# Patient Record
Sex: FEMALE | Race: WHITE | NOT HISPANIC OR LATINO | Employment: OTHER | URBAN - METROPOLITAN AREA
[De-identification: names, ages, dates, MRNs, and addresses within clinical notes are randomized per-mention and may not be internally consistent; named-entity substitution may affect disease eponyms.]

---

## 2021-07-01 ENCOUNTER — EVALUATION (OUTPATIENT)
Dept: PHYSICAL THERAPY | Facility: CLINIC | Age: 61
End: 2021-07-01
Payer: COMMERCIAL

## 2021-07-01 DIAGNOSIS — M54.16 LUMBAR RADICULOPATHY: ICD-10-CM

## 2021-07-01 DIAGNOSIS — M17.12 PRIMARY OSTEOARTHRITIS OF LEFT KNEE: Primary | ICD-10-CM

## 2021-07-01 DIAGNOSIS — M79.605 LEFT LEG PAIN: ICD-10-CM

## 2021-07-01 PROCEDURE — 97110 THERAPEUTIC EXERCISES: CPT | Performed by: PHYSICAL THERAPIST

## 2021-07-01 PROCEDURE — 97162 PT EVAL MOD COMPLEX 30 MIN: CPT | Performed by: PHYSICAL THERAPIST

## 2021-07-01 NOTE — PROGRESS NOTES
PT Evaluation     Today's date: 2021  Patient name: Yvan Christensen  : 1960  MRN: [id number]  Referring provider: Meena Hale MD  Dx:   Encounter Diagnosis     ICD-10-CM    1  Primary osteoarthritis of left knee  M17 12    2  Left leg pain  M79 605    3  Lumbar radiculopathy  M54 16               Assessment  Assessment details: Yvan Christensen is a 63 y o  female who presents with pain, decreased strength, decreased ROM, decreased joint mobility and ambulatory dysfunction  Due to these impairments, patient has difficulty performing ADL's, recreational activities, work-related activities, engaging in social activities, ambulation, stair negotiation, lifting/carrying, transfers  Patient's clinical presentation is consistent with their referring diagnosis of patellofemoral arthritis of  L knee, lumbar radiculopathy, and pain of left lower extremity  Patient has been educated in home exercise program and plan of care  Patient would benefit from skilled physical therapy services to address their aforementioned functional limitations and progress towards prior level of function and independence with home exercise program      Impairments: abnormal gait, abnormal or restricted ROM, activity intolerance, impaired balance, impaired physical strength, lacks appropriate home exercise program, pain with function, poor posture  and poor body mechanics  Functional limitations: walk, stand, STS, stair negotiation, squat, kneeling, disturbed sleep, prolonged sit/drive   Prognosis: good  Prognosis details: + factors: high motivation levels, centralization of symptoms with press ups  - factors: chronicity of pain, age    Goals  Short term goals to be accomplished in 4 weeks:  STG 1: Pt will demo I with HEP to maximize progress between therapy sessions  STG 2: Pt will demo L/S AROM < or = min loss throughout to promote improved functional mobility and body mechanics    STG 3: Pt will reports pain dec freq and intensity 50%  STG 4: Pt will deny sleep disturbance  Long term goals to be accomplished in 12 weeks:  LTG 1: Pt will demo good body mech with >75% functional challenges to prevent reinjury  LTG 2: Pt will be able to return to participating in pilate's class for 90 minutes/hours pain free as per PLOF  LTG 3: Pt will note no pain with prolonged sitting or driving  Plan  Plan details: HEP development, stretching, strengthening, A/AA/PROM, joint mobilizations, posture education, STM/MI as needed to reduce muscle tension, muscle reeducation, PLOC discussed and agreed upon with patient  Patient would benefit from: PT eval and skilled physical therapy  Planned modality interventions: cryotherapy, thermotherapy: hydrocollator packs and unattended electrical stimulation  Planned therapy interventions: manual therapy, neuromuscular re-education, self care, therapeutic activities, therapeutic exercise, home exercise program, stretching, strengthening, flexibility, balance and joint mobilization  Frequency: 2x week  Treatment plan discussed with: patient        Subjective Evaluation    History of Present Illness  Mechanism of injury: Pt is a 62 y/o female who presents to PT with reports of L knee and lumbar radicular pain  She stated that the lumbar radicular pain began about 5 years ago  She treated it with PT and pilates, which was very helpful with improving her pain, however she had to stop for personal reasons  She then began to experience L knee and LE pain so she went to her physician who ordered x-rays, which revealed lumbar and L knee patellar arthritis  She was then prescribed PT       Ganglion cyst removed from medial knee 3 years ago        Pain  Current pain ratin  At best pain ratin  At worst pain ratin  Location: L lumbar radiculopathy  Quality: throbbing, tight, pulling and discomfort  Aggravating factors: stair climbing, standing, walking and lifting    Treatments  Current treatment: physical therapy  Patient Goals  Patient goals for therapy: decreased pain, improved balance, increased motion, increased strength, independence with ADLs/IADLs and return to sport/leisure activities  Patient goal: walk, stand, STS, stair negotiation, squat, kneeling, disturbed sleep, prolonged sit/drive        Objective     Concurrent Complaints  Positive for night pain and disturbed sleep  Negative for bladder dysfunction, bowel dysfunction and saddle (S4) numbness    Postural Observations  Standing posture: fair        Tenderness     Additional Tenderness Details  TTP over lumbar/thoracic parapsinals     Active Range of Motion   Cervical/Thoracic Spine       Thoracic    Extension:  Restriction level: moderate  Left lateral flexion:  Restriction level: moderate  Right lateral flexion:  Restriction level: moderate    Lumbar   Flexion:  Restriction level: minimal  Extension:  Restriction level: moderate  Left lateral flexion:  Restriction level: moderate  Right lateral flexion:  Restriction level: moderate  Left rotation:  Restriction level: moderate  Right rotation:  Restriction level: moderate    Joint Play     Hypomobile: T12, L1, L2, L3, L4, L5 and S1     Pain: L2, L3, L4, L5 and S1   Mechanical Assessment    Cervical      Thoracic    Seated extension: repeated movements  Pain location: peripheralized  Pain intensity: worse  Lying extension: repeated movements  Pain location: centralized  Pain intensity: better    Lumbar    Lying extension: repeated movements  Pain location: centralized  Pain intensity: better    Strength/Myotome Testing     Left Hip   Planes of Motion   Flexion: 4-    Right Hip   Planes of Motion   Flexion: 4-    Left Knee   Flexion: 4  Extension: 4    Right Knee   Flexion: 4  Extension: 4    Tests     Lumbar     Left   Positive quadrant  Negative crossed SLR and passive SLR  Right   Positive quadrant  Negative crossed SLR and passive SLR            Precautions:   HTN  Family history of cardiovascular disease  Depression, treated w/ medication and psych    Manuals 7/1            STM             Jt mobs                                       Neuro Re-Ed 7/1                                                                                                       Ther Ex 7/1            REIL 2x10            TONY 2x10            S/l thoracic rotation 2x10            Seated thoracic extension                                                                  Ther Activity 7/1                                                                             Modalities 7/1                         CT

## 2021-07-01 NOTE — LETTER
2021    Lauri Aguilera, [facility]    Patient: Nguyen Lind   YOB: 1960   Date of Visit: 2021     Encounter Diagnosis     ICD-10-CM    1  Primary osteoarthritis of left knee  M17 12    2  Left leg pain  M79 605    3  Lumbar radiculopathy  M54 16        Dear Dr Lizz Roland: Thank you for your recent referral of Nguyen Lind  Please review the attached evaluation summary from Tami's recent visit  Please verify that you agree with the plan of care by signing the attached order  If you have any questions or concerns, please do not hesitate to call  I sincerely appreciate the opportunity to share in the care of one of your patients and hope to have another opportunity to work with you in the near future  Sincerely,    Yajaira Patient, PT      Referring Provider:      I certify that I have read the below Plan of Care and certify the need for these services furnished under this plan of treatment while under my care  Lauri Aguilera, [address]  [address]  Via Fax: [phone]          PT Evaluation     Today's date: 2021  Patient name: Nguyen Lind  : 1960  MRN: [id number]  Referring provider: Manas Lewis MD  Dx:   Encounter Diagnosis     ICD-10-CM    1  Primary osteoarthritis of left knee  M17 12    2  Left leg pain  M79 605    3  Lumbar radiculopathy  M54 16               Assessment  Assessment details: Nguyen Lind is a 63 y o  female who presents with pain, decreased strength, decreased ROM, decreased joint mobility and ambulatory dysfunction  Due to these impairments, patient has difficulty performing ADL's, recreational activities, work-related activities, engaging in social activities, ambulation, stair negotiation, lifting/carrying, transfers   Patient's clinical presentation is consistent with their referring diagnosis of patellofemoral arthritis of  L knee, lumbar radiculopathy, and pain of left lower extremity  Patient has been educated in home exercise program and plan of care  Patient would benefit from skilled physical therapy services to address their aforementioned functional limitations and progress towards prior level of function and independence with home exercise program      Impairments: abnormal gait, abnormal or restricted ROM, activity intolerance, impaired balance, impaired physical strength, lacks appropriate home exercise program, pain with function, poor posture  and poor body mechanics  Functional limitations: walk, stand, STS, stair negotiation, squat, kneeling, disturbed sleep, prolonged sit/drive   Prognosis: good  Prognosis details: + factors: high motivation levels, centralization of symptoms with press ups  - factors: chronicity of pain, age    Goals  Short term goals to be accomplished in 4 weeks:  STG 1: Pt will demo I with HEP to maximize progress between therapy sessions  STG 2: Pt will demo L/S AROM < or = min loss throughout to promote improved functional mobility and body mechanics  STG 3: Pt will reports pain dec freq and intensity 50%  STG 4: Pt will deny sleep disturbance  Long term goals to be accomplished in 12 weeks:  LTG 1: Pt will demo good body mech with >75% functional challenges to prevent reinjury  LTG 2: Pt will be able to return to participating in pilLISNR's class for 90 minutes/hours pain free as per PLOF  LTG 3: Pt will note no pain with prolonged sitting or driving  Plan  Plan details: HEP development, stretching, strengthening, A/AA/PROM, joint mobilizations, posture education, STM/MI as needed to reduce muscle tension, muscle reeducation, PLOC discussed and agreed upon with patient        Patient would benefit from: PT eval and skilled physical therapy  Planned modality interventions: cryotherapy, thermotherapy: hydrocollator packs and unattended electrical stimulation  Planned therapy interventions: manual therapy, neuromuscular re-education, self care, therapeutic activities, therapeutic exercise, home exercise program, stretching, strengthening, flexibility, balance and joint mobilization  Frequency: 2x week  Treatment plan discussed with: patient        Subjective Evaluation    History of Present Illness  Mechanism of injury: Pt is a 64 y/o female who presents to PT with reports of L knee and lumbar radicular pain  She stated that the lumbar radicular pain began about 5 years ago  She treated it with PT and pilates, which was very helpful with improving her pain, however she had to stop for personal reasons  She then began to experience L knee and LE pain so she went to her physician who ordered x-rays, which revealed lumbar and L knee patellar arthritis  She was then prescribed PT  Ganglion cyst removed from medial knee 3 years ago        Pain  Current pain ratin  At best pain ratin  At worst pain ratin  Location: L lumbar radiculopathy  Quality: throbbing, tight, pulling and discomfort  Aggravating factors: stair climbing, standing, walking and lifting    Treatments  Current treatment: physical therapy  Patient Goals  Patient goals for therapy: decreased pain, improved balance, increased motion, increased strength, independence with ADLs/IADLs and return to sport/leisure activities  Patient goal: walk, stand, STS, stair negotiation, squat, kneeling, disturbed sleep, prolonged sit/drive        Objective     Concurrent Complaints  Positive for night pain and disturbed sleep   Negative for bladder dysfunction, bowel dysfunction and saddle (S4) numbness    Postural Observations  Standing posture: fair        Tenderness     Additional Tenderness Details  TTP over lumbar/thoracic parapsinals     Active Range of Motion   Cervical/Thoracic Spine       Thoracic    Extension:  Restriction level: moderate  Left lateral flexion:  Restriction level: moderate  Right lateral flexion: Restriction level: moderate    Lumbar   Flexion:  Restriction level: minimal  Extension:  Restriction level: moderate  Left lateral flexion:  Restriction level: moderate  Right lateral flexion:  Restriction level: moderate  Left rotation:  Restriction level: moderate  Right rotation:  Restriction level: moderate    Joint Play     Hypomobile: T12, L1, L2, L3, L4, L5 and S1     Pain: L2, L3, L4, L5 and S1   Mechanical Assessment    Cervical      Thoracic    Seated extension: repeated movements  Pain location: peripheralized  Pain intensity: worse  Lying extension: repeated movements  Pain location: centralized  Pain intensity: better    Lumbar    Lying extension: repeated movements  Pain location: centralized  Pain intensity: better    Strength/Myotome Testing     Left Hip   Planes of Motion   Flexion: 4-    Right Hip   Planes of Motion   Flexion: 4-    Left Knee   Flexion: 4  Extension: 4    Right Knee   Flexion: 4  Extension: 4    Tests     Lumbar     Left   Positive quadrant  Negative crossed SLR and passive SLR  Right   Positive quadrant  Negative crossed SLR and passive SLR            Precautions:   HTN  Family history of cardiovascular disease  Depression, treated w/ medication and psych    Manuals 7/1            STM             Jt mobs                                       Neuro Re-Ed 7/1                                                                                                       Ther Ex 7/1            REIL 2x10            TONY 2x10            S/l thoracic rotation 2x10            Seated thoracic extension                                                                  Ther Activity 7/1                                                                             Modalities 7/1                         CT

## 2021-07-14 ENCOUNTER — APPOINTMENT (OUTPATIENT)
Dept: PHYSICAL THERAPY | Facility: CLINIC | Age: 61
End: 2021-07-14
Payer: COMMERCIAL

## 2021-07-19 ENCOUNTER — OFFICE VISIT (OUTPATIENT)
Dept: PHYSICAL THERAPY | Facility: CLINIC | Age: 61
End: 2021-07-19
Payer: COMMERCIAL

## 2021-07-19 DIAGNOSIS — M17.12 PRIMARY OSTEOARTHRITIS OF LEFT KNEE: Primary | ICD-10-CM

## 2021-07-19 DIAGNOSIS — M79.605 LEFT LEG PAIN: ICD-10-CM

## 2021-07-19 DIAGNOSIS — M54.16 LUMBAR RADICULOPATHY: ICD-10-CM

## 2021-07-19 PROCEDURE — 97112 NEUROMUSCULAR REEDUCATION: CPT

## 2021-07-19 PROCEDURE — 97110 THERAPEUTIC EXERCISES: CPT

## 2021-07-19 NOTE — PROGRESS NOTES
Daily Note     Today's date: 2021  Patient name: Madeline Kraft  : 1960  MRN: [id number]  Referring provider: Sandro Gonzalez MD  Dx:   Encounter Diagnosis     ICD-10-CM    1  Primary osteoarthritis of left knee  M17 12    2  Left leg pain  M79 605    3  Lumbar radiculopathy  M54 16                   Subjective: Elis Candelario reports over the last few weeks she has not been as complaint with HEP stretching as she should be, however when she does do the exercises her low back feels better  Objective: See treatment diary below      Assessment: Elis Candelario tolerated PT treatment well  Reviewed lumbar thoracic mobility exercise with patient, pt performed well  Initiated core stability and strength training as noted by PT  She displays strong TA engagement and was able to maintain neurtral spine throughout LE movements  Greatest challenge observed with pallof press secondary to weakness  Patient denied symptom provocation throughout and post treatment session  Pt would benefit from continued PT to further address impairments and maximize functional level  Plan: Continue per plan of care        Precautions:   HTN  Family history of cardiovascular disease  Depression, treated w/ medication and psych    Manuals            STM             Jt mobs                                       Neuro Re-Ed            TA march   20x           TA SLR   2x10 b/l            TA BKFO   20x           Pallof press  10/ 2x10           Pallof rotation                                        Ther Ex            REIL 2x10 2x10            TONY 2x10 2x10           S/l thoracic rotation 2x10 2x10            Seated thoracic extension   Handstogether 2x10            Side bends   20x b/l                                                  Ther Activity                                                                             Modalities            [address]             CT

## 2021-07-22 ENCOUNTER — OFFICE VISIT (OUTPATIENT)
Dept: PHYSICAL THERAPY | Facility: CLINIC | Age: 61
End: 2021-07-22
Payer: COMMERCIAL

## 2021-07-22 DIAGNOSIS — M54.16 LUMBAR RADICULOPATHY: ICD-10-CM

## 2021-07-22 DIAGNOSIS — M17.12 PRIMARY OSTEOARTHRITIS OF LEFT KNEE: Primary | ICD-10-CM

## 2021-07-22 DIAGNOSIS — M79.605 LEFT LEG PAIN: ICD-10-CM

## 2021-07-22 PROCEDURE — 97140 MANUAL THERAPY 1/> REGIONS: CPT | Performed by: PHYSICAL THERAPIST

## 2021-07-22 PROCEDURE — 97112 NEUROMUSCULAR REEDUCATION: CPT | Performed by: PHYSICAL THERAPIST

## 2021-07-22 PROCEDURE — 97110 THERAPEUTIC EXERCISES: CPT | Performed by: PHYSICAL THERAPIST

## 2021-07-22 NOTE — PROGRESS NOTES
Daily Note     Today's date: 2021  Patient name: Aidan Munson  : 1960  MRN: [id number]  Referring provider: Azar Ledbetter MD  Dx:   Encounter Diagnosis     ICD-10-CM    1  Primary osteoarthritis of left knee  M17 12    2  Left leg pain  M79 605    3  Lumbar radiculopathy  M54 16             Subjective: Pt reported that she did buckle her knee recently, and stated that it did increase her pain, however the pain has been reduced to 1/10 since  Objective: See treatment diary below    Assessment: Tolerated treatment well  Advanced core stabilization well without provocation of pain  Patient demonstrated fatigue post treatment, exhibited good technique with therapeutic exercises and would benefit from continued PT  Plan: Continue per plan of care        Precautions:   HTN  Family history of cardiovascular disease  Depression, treated w/ medication and psych    Manuals           STM             Jt mobs             Mobilization with movement/over pressure   JZ                       Neuro Re-Ed           TA march   20x           TA SLR   2x10 b/l            TA BKFO   20x           Pallof press  10/ 2x10 10/ 3x10          Pallof rotation    9/ 3x10          pallof punch   13/ 3x10          Plank   :25x3          BOSU crunch   3x10                                    Ther Ex           REIL 2x10 2x10  10x          TONY 2x10 2x10           S/l thoracic rotation 2x10 2x10  20x w/ over press          Seated thoracic extension   Hands together 2x10  Hand together w/ over press 20x          Side bends   20x b/l           Cat/cow   2x10                                    Ther Activity                                                                            Modalities                        CT

## 2021-07-26 ENCOUNTER — OFFICE VISIT (OUTPATIENT)
Dept: PHYSICAL THERAPY | Facility: CLINIC | Age: 61
End: 2021-07-26
Payer: COMMERCIAL

## 2021-07-26 DIAGNOSIS — M79.605 LEFT LEG PAIN: ICD-10-CM

## 2021-07-26 DIAGNOSIS — M54.16 LUMBAR RADICULOPATHY: ICD-10-CM

## 2021-07-26 DIAGNOSIS — M17.12 PRIMARY OSTEOARTHRITIS OF LEFT KNEE: Primary | ICD-10-CM

## 2021-07-26 PROCEDURE — 97110 THERAPEUTIC EXERCISES: CPT

## 2021-07-26 PROCEDURE — 97112 NEUROMUSCULAR REEDUCATION: CPT

## 2021-07-26 NOTE — PROGRESS NOTES
Daily Note     Today's date: 2021  Patient name: Meena Sheikh  : 1960  MRN: [id number]  Referring provider: Tania Etienne MD  Dx:   Encounter Diagnosis     ICD-10-CM    1  Primary osteoarthritis of left knee  M17 12    2  Left leg pain  M79 605    3  Lumbar radiculopathy  M54 16             Subjective: Christiano Andre reports over the weekend she has some low back discomfort after prolonged standing  She states she went on a 4 mile walk this morning, L knee felt a little aggrivated     Objective: See treatment diary below    Assessment: Christiano Andre tolerated PT treatment well  Initiated PT session with lumbar and thoracic mobility exercises  Continued with focus on core strength and stabilization training this session  Advanced TA march to  toe taps, pt challenged with LE control  She performed prescribed exercises well and w/o symptom provocation  Fatigue evident post session  Pt would benefit from continued PT to further address impairments and maximize functional level  Plan: Continue per plan of care        Precautions:   HTN  Family history of cardiovascular disease  Depression, treated w/ medication and psych    Manuals          STM             Jt mobs             Mobilization with movement/over pressure   JZ                       Neuro Re-Ed          TA march   20x  90/90 toe taps 20x          TA SLR   2x10 b/l   3x10 b/l          TA BKFO   20x           Pallof press  10/ 2x10 10/ 3x10 10/ 3x10          Pallof rotation    9/ 3x10 9/ 3x10         pallof punch   13/ 3x10          Plank   :25x3 :30x3          BOSU crunch   3x10 3x10                                    Ther Ex          REIL 2x10 2x10  10x 2x10         TONY 2x10 2x10           S/l thoracic rotation 2x10 2x10  20x w/ over press 3# 20x b/l          Seated thoracic extension   Hands together 2x10  Hand together w/ over press 20x Hands together 30x          Side bends   20x b/l Cat/cow   2x10 2x10                                    Ther Activity 7/1 7/19 7/22 7/26                                                                          Modalities 7/1 7/19 7/22 7/26                      CT

## 2021-07-29 ENCOUNTER — OFFICE VISIT (OUTPATIENT)
Dept: PHYSICAL THERAPY | Facility: CLINIC | Age: 61
End: 2021-07-29
Payer: COMMERCIAL

## 2021-07-29 DIAGNOSIS — M54.16 LUMBAR RADICULOPATHY: ICD-10-CM

## 2021-07-29 DIAGNOSIS — M17.12 PRIMARY OSTEOARTHRITIS OF LEFT KNEE: Primary | ICD-10-CM

## 2021-07-29 DIAGNOSIS — M79.605 LEFT LEG PAIN: ICD-10-CM

## 2021-07-29 PROCEDURE — 97110 THERAPEUTIC EXERCISES: CPT

## 2021-07-29 PROCEDURE — 97530 THERAPEUTIC ACTIVITIES: CPT

## 2021-07-29 PROCEDURE — 97140 MANUAL THERAPY 1/> REGIONS: CPT

## 2021-07-29 NOTE — PROGRESS NOTES
Daily Note     Today's date: 2021  Patient name: Jill Barrett  : 1960  MRN: [id number]  Referring provider: Kassidy Reyes MD  Dx:   Encounter Diagnosis     ICD-10-CM    1  Primary osteoarthritis of left knee  M17 12    2  Left leg pain  M79 605    3  Lumbar radiculopathy  M54 16             Subjective: Baldev Gupta reports her back has been feeing "pretty good" the last week  She states the other day she "tweaked" her L knee and has experienced soreness since  She states she is still able to complete a lot activity, but feels her knee is there  Objective: See treatment diary below    Assessment: Baldev Gupta tolerated PT treatment well  Today's session focused on LE stretching and strength training  L knee PROM wfl, some TTP present along proximal peroneals with STM  Progress LE strengthening with addition of step ups, lateral steps downs, lunges, and TB bridges  Pt was challenged with eccnetric control of lateral step down, greater difficulty observed on LLE  She denied L knee pain throughout and post session  Pt would benefit from continued PT to further address impairments and maximize functional level  Plan: Continue per plan of care        Precautions:   HTN  Family history of cardiovascular disease  Depression, treated w/ medication and psych    Manuals         STM             Jt mobs             Mobilization with movement/over pressure   JZ                       Neuro Re-Ed         TA march   20x  90/90 toe taps 20x          TA SLR   2x10 b/l   3x10 b/l  3x10b/l        TA BKFO   20x           Pallof press  10/ 2x10 10/ 3x10 10/ 3x10          Pallof rotation    9/ 3x10 9/ 3x10         pallof punch   13/ 3x10          Plank   :25x3 :30x3          BOSU crunch   3x10 3x10                                    Ther Ex         REIL 2x10 2x10  10x 2x10         TONY 2x10 2x10           S/l thoracic rotation 2x10 2x10  20x w/ over press 3# 20x b/l          Seated thoracic extension   Hands together 2x10  Hand together w/ over press 20x Hands together 30x          Side bends   20x b/l           Cat/cow   2x10 2x10          Bridges      BTB 3x10         Lunges      x20 b/l         Ther Activity 7/1 7/19 7/22 7/26 7/29        Step ups      8" 3x10        Lateral step downs      8" 3x10                                                Modalities 7/1 7/19 7/22 7/26 7/29        [address]             CT

## 2021-08-02 ENCOUNTER — OFFICE VISIT (OUTPATIENT)
Dept: PHYSICAL THERAPY | Facility: CLINIC | Age: 61
End: 2021-08-02
Payer: COMMERCIAL

## 2021-08-02 DIAGNOSIS — M17.12 PRIMARY OSTEOARTHRITIS OF LEFT KNEE: Primary | ICD-10-CM

## 2021-08-02 DIAGNOSIS — M79.605 LEFT LEG PAIN: ICD-10-CM

## 2021-08-02 DIAGNOSIS — M54.16 LUMBAR RADICULOPATHY: ICD-10-CM

## 2021-08-02 PROCEDURE — 97110 THERAPEUTIC EXERCISES: CPT

## 2021-08-02 PROCEDURE — 97530 THERAPEUTIC ACTIVITIES: CPT

## 2021-08-02 NOTE — PROGRESS NOTES
Hands together 30x          Side bends   20x b/l           Cat/cow   2x10 2x10   2x10       Clamshells       BTB 20x       Bridges      BTB 3x10  BTB 3x10        Lunges      x20 b/l  x20 b/l        Ther Activity 7/1 7/19 7/22 7/26 7/29 8/2       Step ups      8" 3x10 8" 3x10       Lateral step downs      8" 3x10  8" 3x10                                              Modalities 7/1 7/19 7/22 7/26 7/29 8/2                    CT

## 2021-08-05 ENCOUNTER — APPOINTMENT (OUTPATIENT)
Dept: PHYSICAL THERAPY | Facility: CLINIC | Age: 61
End: 2021-08-05
Payer: COMMERCIAL

## 2021-08-06 ENCOUNTER — OFFICE VISIT (OUTPATIENT)
Dept: PHYSICAL THERAPY | Facility: CLINIC | Age: 61
End: 2021-08-06
Payer: COMMERCIAL

## 2021-08-06 DIAGNOSIS — M79.605 LEFT LEG PAIN: ICD-10-CM

## 2021-08-06 DIAGNOSIS — M54.16 LUMBAR RADICULOPATHY: ICD-10-CM

## 2021-08-06 DIAGNOSIS — M17.12 PRIMARY OSTEOARTHRITIS OF LEFT KNEE: Primary | ICD-10-CM

## 2021-08-06 PROCEDURE — 97530 THERAPEUTIC ACTIVITIES: CPT | Performed by: PHYSICAL THERAPIST

## 2021-08-06 PROCEDURE — 97110 THERAPEUTIC EXERCISES: CPT | Performed by: PHYSICAL THERAPIST

## 2021-08-06 NOTE — PROGRESS NOTES
Daily Note     Today's date: 2021  Patient name: Megan Horn  : 1960  MRN: [id number]  Referring provider: Toñito Diane MD  Dx:   Encounter Diagnosis     ICD-10-CM    1  Primary osteoarthritis of left knee  M17 12    2  Left leg pain  M79 605    3  Lumbar radiculopathy  M54 16             Subjective: Pt reported that she is improving significantly with her back pain, however notes that she is frustrated by her L knee pain and feeling of instability  Objective: See treatment diary below    Assessment: Tolerated treatment well  Advanced strengthening well without provocation of pain  Patient demonstrated fatigue post treatment, exhibited good technique with therapeutic exercises and would benefit from continued PT  Plan: Continue per plan of care        Precautions:   HTN  Family history of cardiovascular disease  Depression, treated w/ medication and psych    Manuals       STM             Jt mobs             Mobilization with movement/over pressure   JZ                       Neuro Re-Ed       TA march   20x  90/90 toe taps 20x          TA SLR   2x10 b/l   3x10 b/l  3x10b/l 3x10b/l       TA BKFO   20x           Pallof press  10/ 2x10 10/ 3x10 10/ 3x10          Pallof rotation    9/ 3x10 9/ 3x10         pallof punch   13/ 3x10          Plank   :25x3 :30x3          BOSU crunch   3x10 3x10                                    Ther Ex       REIL 2x10 2x10  10x 2x10         TONY 2x10 2x10           S/l thoracic rotation 2x10 2x10  20x w/ over press 3# 20x b/l   3# 20x b/l        Seated thoracic extension   Hands together 2x10  Hand together w/ over press 20x Hands together 30x          Side bends   20x b/l           Prone knee flexion stretch       :30x3      Seated hamstring stretch step       :30x3      S/l hip abduction       2x10 ea      Figure 4 bridge       2x10 ea      S/l hip abd       2x10 ea Cat/cow   2x10 2x10   2x10       Clamshells       BTB 20x       Bridges      BTB 3x10  BTB 3x10        Lunges      x20 b/l  x20 b/l        Ther Activity 7/1 7/19 7/22 7/26 7/29 8/2 8/6      Step ups      8" 3x10 8" 3x10       Lateral step downs      8" 3x10  8" 3x10       Elliptical        3'       Leg press u/l in plantar flexion       30/ 3x15                   Modalities 7/1 7/19 7/22 7/26 7/29 8/2 8/6                   CT

## 2021-08-09 ENCOUNTER — OFFICE VISIT (OUTPATIENT)
Dept: PHYSICAL THERAPY | Facility: CLINIC | Age: 61
End: 2021-08-09
Payer: COMMERCIAL

## 2021-08-09 DIAGNOSIS — M54.16 LUMBAR RADICULOPATHY: ICD-10-CM

## 2021-08-09 DIAGNOSIS — M17.12 PRIMARY OSTEOARTHRITIS OF LEFT KNEE: Primary | ICD-10-CM

## 2021-08-09 DIAGNOSIS — M79.605 LEFT LEG PAIN: ICD-10-CM

## 2021-08-09 PROCEDURE — 97530 THERAPEUTIC ACTIVITIES: CPT

## 2021-08-09 PROCEDURE — 97110 THERAPEUTIC EXERCISES: CPT

## 2021-08-09 NOTE — PROGRESS NOTES
Daily Note     Today's date: 2021  Patient name: Valentine Diaz  : 1960  MRN: [id number]  Referring provider: Tyron Solis MD  Dx:   Encounter Diagnosis     ICD-10-CM    1  Primary osteoarthritis of left knee  M17 12    2  Left leg pain  M79 605    3  Lumbar radiculopathy  M54 16             Subjective: Milli Jones reports she felt good after last PT session  However over the weekend, she was standing for multiple hours cooking, causing increased low back pain by the end of the night  She went on a 5 mile walk the following day, reporting L knee pain by the end of the walk  Objective: See treatment diary below    Assessment: Milli Jones tolerated PT treatment well  Initiated PT session with warmup on RB and lumbar/thoracic mobility exercises  Continued with glut strengthening, weakness remains evident  She performed therapeutic exercises with good technique, minimal cueing required  She denied L knee or lumbar sx aggravation throughout and post session  Pt would benefit from continued PT to further address impairments and maximize functional level  Plan: Continue per plan of care        Precautions:   HTN  Family history of cardiovascular disease  Depression, treated w/ medication and psych    Manuals      STM             Jt mobs             Mobilization with movement/over pressure   JZ                       Neuro Re-Ed      TA march   20x  90/90 toe taps 20x          TA SLR   2x10 b/l   3x10 b/l  3x10b/l 3x10b/l  3x10 b/l     TA BKFO   20x           Pallof press  10/ 2x10 10/ 3x10 10/ 3x10          Pallof rotation    9/ 3x10 9/ 3x10         pallof punch   13/ 3x10          Plank   :25x3 :30x3          BOSU crunch   3x10 3x10                                    Ther Ex      REIL 2x10 2x10  10x 2x10         TONY 2x10 2x10           S/l thoracic rotation 2x10 2x10  20x w/ over press 3# 20x b/l   3# 20x b/l   3# 20x b/l      Seated thoracic extension   Hands together 2x10  Hand together w/ over press 20x Hands together 30x          Side bends   20x b/l           Prone knee flexion stretch       :30x3 :30x3     Seated hamstring stretch step       :30x3 :30x3     S/l hip abduction       2x10 ea 2x10      Figure 4 bridge       2x10 ea 3x10 ea      Cat/cow   2x10 2x10   2x10       Clamshells       BTB 20x       Bridges      BTB 3x10  BTB 3x10        Lunges      x20 b/l  x20 b/l        Ther Activity 7/1 7/19 7/22 7/26 7/29 8/2 8/6 8/9     Step ups      8" 3x10 8" 3x10       Lateral step downs      8" 3x10  8" 3x10       Elliptical        3'  RB 5'     Leg press u/l in plantar flexion       30/ 3x15 30/ 3x15                  Modalities 7/1 7/19 7/22 7/26 7/29 8/2 8/6 8/9                  CT

## 2021-08-12 ENCOUNTER — OFFICE VISIT (OUTPATIENT)
Dept: PHYSICAL THERAPY | Facility: CLINIC | Age: 61
End: 2021-08-12
Payer: COMMERCIAL

## 2021-08-12 DIAGNOSIS — M54.16 LUMBAR RADICULOPATHY: ICD-10-CM

## 2021-08-12 DIAGNOSIS — M79.605 LEFT LEG PAIN: ICD-10-CM

## 2021-08-12 DIAGNOSIS — M17.12 PRIMARY OSTEOARTHRITIS OF LEFT KNEE: Primary | ICD-10-CM

## 2021-08-12 PROCEDURE — 97110 THERAPEUTIC EXERCISES: CPT | Performed by: PHYSICAL THERAPIST

## 2021-08-12 PROCEDURE — 97140 MANUAL THERAPY 1/> REGIONS: CPT | Performed by: PHYSICAL THERAPIST

## 2021-08-12 PROCEDURE — 97530 THERAPEUTIC ACTIVITIES: CPT | Performed by: PHYSICAL THERAPIST

## 2021-08-12 NOTE — PROGRESS NOTES
Daily Note     Today's date: 2021  Patient name: Catarina Ramirez  : 1960  MRN: [id number]  Referring provider: Sariah Cantu MD  Dx:   Encounter Diagnosis     ICD-10-CM    1  Primary osteoarthritis of left knee  M17 12    2  Left leg pain  M79 605    3  Lumbar radiculopathy  M54 16             Subjective: Pt reported, "I am actually a little bit better today "     Objective: See treatment diary below    Assessment: Tolerated treatment well  Patient demonstrated fatigue post treatment, exhibited good technique with therapeutic exercises and would benefit from continued PT  Plan: Continue per plan of care        Precautions:   HTN  Family history of cardiovascular disease   Depression, treated w/ medication and psych    Manuals     STM         JZ    Jt mobs             Mobilization with movement/over pressure   MAIDA BEY                 Neuro Re-Ed     TA march   20x  90/90 toe taps 20x          TA SLR   2x10 b/l   3x10 b/l  3x10b/l 3x10b/l  3x10 b/l     TA BKFO   20x           Pallof press  10/ 2x10 10/ 3x10 10/ 3x10          Pallof rotation    9/ 3x10 9/ 3x10         pallof punch   13/ 3x10          Plank   :25x3 :30x3          BOSU crunch   3x10 3x10                                    Ther Ex     REIL 2x10 2x10  10x 2x10         TONY 2x10 2x10           S/l thoracic rotation 2x10 2x10  20x w/ over press 3# 20x b/l   3# 20x b/l   3# 20x b/l      Seated thoracic extension   Hands together 2x10  Hand together w/ over press 20x Hands together 30x          Calf stretch step         :30x3    Prone knee flexion stretch       :30x3 :30x3 :30x3 ea    Seated hamstring stretch step       :30x3 :30x3 :30x3 ea    S/l hip abduction       2x10 ea 2x10      Figure 4 bridge       2x10 ea 3x10 ea      Cat/cow   2x10 2x10   2x10       Clamshells       BTB 20x       Bridges      BTB 3x10  BTB 3x10        Lunges      x20 b/l  x20 b/l    Slider 2x10 ea    Ther Activity 7/1 7/19 7/22 7/26 7/29 8/2 8/6 8/9 8/12    Step ups      8" 3x10 8" 3x10   8" 3x10    Lateral step downs      8" 3x10  8" 3x10   8" 3x10    Elliptical        3'  RB 5' RB 5'     Leg press u/l in plantar flexion       30/ 3x15 30/ 3x15 30/     LP 2 leg concentric, 1 leg eccentric         70/ 3x10    Modalities 7/1 7/19 7/22 7/26 7/29 8/2 8/6 8/9 8/12                 CT

## 2021-08-16 ENCOUNTER — APPOINTMENT (OUTPATIENT)
Dept: PHYSICAL THERAPY | Facility: CLINIC | Age: 61
End: 2021-08-16
Payer: COMMERCIAL

## 2021-08-20 ENCOUNTER — OFFICE VISIT (OUTPATIENT)
Dept: PHYSICAL THERAPY | Facility: CLINIC | Age: 61
End: 2021-08-20
Payer: COMMERCIAL

## 2021-08-20 DIAGNOSIS — M54.16 LUMBAR RADICULOPATHY: ICD-10-CM

## 2021-08-20 DIAGNOSIS — M17.12 PRIMARY OSTEOARTHRITIS OF LEFT KNEE: Primary | ICD-10-CM

## 2021-08-20 DIAGNOSIS — M79.605 LEFT LEG PAIN: ICD-10-CM

## 2021-08-20 PROCEDURE — 97530 THERAPEUTIC ACTIVITIES: CPT

## 2021-08-20 PROCEDURE — 97110 THERAPEUTIC EXERCISES: CPT

## 2021-08-20 PROCEDURE — 97140 MANUAL THERAPY 1/> REGIONS: CPT

## 2021-08-20 NOTE — PROGRESS NOTES
Daily Note     Today's date: 2021  Patient name: Yvan Christensen  : 1960  MRN: [id number]  Referring provider: Meena Hale MD  Dx:   Encounter Diagnosis     ICD-10-CM    1  Primary osteoarthritis of left knee  M17 12    2  Left leg pain  M79 605    3  Lumbar radiculopathy  M54 16             Subjective: Jose Doran reports she did not have a very active week, therefor her knee and low back have had minimal discomfort  Objective: See treatment diary below    Assessment: Jose Doran displays good tolerance to current POC  Advanced LE strength training with increased repetitions, pt performed well  She completed stair navigation w/o symptom provocation  Pt responds favorably to over pressure with thoracic and extension mobilization  Fatigue evident post session  Pt would benefit from continued PT to further address impairments and maximize functional level  Plan: Continue per plan of care      ladfeoi  Precautions:   HTN  Family history of cardiovascular disease   Depression, treated w/ medication and psych    Manuals    STM         MAIDA    Jt mobs             Mobilization with movement/over pressure   MAIDA BEY JW                Neuro Re-Ed    TA march   20x  90/90 toe taps 20x          TA SLR   2x10 b/l   3x10 b/l  3x10b/l 3x10b/l  3x10 b/l     TA BKFO   20x           Pallof press  10/ 2x10 10/ 3x10 10/ 3x10          Pallof rotation    9/ 3x10 9/ 3x10         pallof punch   13/ 3x10          Plank   :25x3 :30x3          BOSU crunch   3x10 3x10                                    Ther Ex    REIL 2x10 2x10  10x 2x10         TONY 2x10 2x10           S/l thoracic rotation 2x10 2x10  20x w/ over press 3# 20x b/l   3# 20x b/l   3# 20x b/l   3# 20x b/l    Seated thoracic extension   Hands together 2x10  Hand together w/ over press 20x Hands together 30x          Calf stretch step         :30x3 :30x3    Prone knee flexion stretch       :30x3 :30x3 :30x3 ea :30x3 ea    Seated hamstring stretch step       :30x3 :30x3 :30x3 ea :30x3 ea    S/l hip abduction       2x10 ea 2x10      Figure 4 bridge       2x10 ea 3x10 ea      Cat/cow   2x10 2x10   2x10       Clamshells       BTB 20x       Bridges      BTB 3x10  BTB 3x10        Lunges      x20 b/l  x20 b/l    Slider 2x10 ea Slider 3x10 ea fw lat 2x10 ea   Ther Activity 7/1 7/19 7/22 7/26 7/29 8/2 8/6 8/9 8/12    Step ups      8" 3x10 8" 3x10   8" 3x10 8" 3x10   Lateral step downs      8" 3x10  8" 3x10   8" 3x10 8" 3x10   Elliptical        3'  RB 5' RB 5'  RB 5'    Leg press u/l in plantar flexion       30/ 3x15 30/ 3x15 30/     LP 2 leg concentric, 1 leg eccentric         70/ 3x10 70/ 3x10    Modalities 7/1 7/19 7/22 7/26 7/29 8/2 8/6 8/9 8/12 8/20                CT

## 2021-08-24 ENCOUNTER — OFFICE VISIT (OUTPATIENT)
Dept: PHYSICAL THERAPY | Facility: CLINIC | Age: 61
End: 2021-08-24
Payer: COMMERCIAL

## 2021-08-24 DIAGNOSIS — M54.16 LUMBAR RADICULOPATHY: ICD-10-CM

## 2021-08-24 DIAGNOSIS — M17.12 PRIMARY OSTEOARTHRITIS OF LEFT KNEE: Primary | ICD-10-CM

## 2021-08-24 DIAGNOSIS — M79.605 LEFT LEG PAIN: ICD-10-CM

## 2021-08-24 PROCEDURE — 97110 THERAPEUTIC EXERCISES: CPT

## 2021-08-24 PROCEDURE — 97530 THERAPEUTIC ACTIVITIES: CPT

## 2021-08-24 NOTE — PROGRESS NOTES
Daily Note     Today's date: 2021  Patient name: Anjel Clayton  : 1960  MRN: [id number]  Referring provider: Velvet Barros MD  Dx:   Encounter Diagnosis     ICD-10-CM    1  Primary osteoarthritis of left knee  M17 12    2  Left leg pain  M79 605    3  Lumbar radiculopathy  M54 16             Subjective: Naveen Jack reports she went on a stream hike over the weekend, noting her knee felt unsttable   She states L knee continues to be bothersome with vigorous activity  Objective: See treatment diary below    Assessment: Naveen Jack tolerated PT treatment well  Today's session focused on knee ROM and LE strength training  Pt performed prescribed exercises well and w/o symptom provocation  Weighted SLR added back into exercise program, greater challenged on LLE  Pt would benefit from continued PT to further address impairments and maximize functional level  Plan: Continue per plan of care      ladfeoi  Precautions:   HTN  Family history of cardiovascular disease   Depression, treated w/ medication and psych    Manuals    STM         JZ    Jt mobs             Mobilization with movement/over pressure   MAIDA CHAVEZZ JW                Neuro Re-Ed    TA  toe taps 20x          TA SLR  4#  3x10    3x10 b/l  3x10b/l 3x10b/l  3x10 b/l     TA BKFO              Pallof press   10/ 3x10 10/ 3x10          Pallof rotation    9/ 3x10 9/ 3x10         pallof punch   13/ 3x10          Plank   :25x3 :30x3          BOSU crunch   3x10 3x10                                    Ther Ex    REIL   10x 2x10         TONY             S/l thoracic rotation   20x w/ over press 3# 20x b/l   3# 20x b/l   3# 20x b/l   3# 20x b/l    Seated thoracic extension    Hand together w/ over press 20x Hands together 30x          Calf stretch step         :30x3 :30x3    Prone knee flexion stretch :30x3 :30x3 :30x3 :30x3 ea :30x3 ea    Seated hamstring stretch step :30x3 ea       :30x3 :30x3 :30x3 ea :30x3 ea    S/l hip abduction       2x10 ea 2x10      Figure 4 bridge       2x10 ea 3x10 ea      Cat/cow   2x10 2x10   2x10       Clamshells       BTB 20x       Bridges      BTB 3x10  BTB 3x10        Lunges  Fwd/lat 3x10    x20 b/l  x20 b/l    Slider 2x10 ea Slider 3x10 ea fw lat 2x10 ea   Ther Activity 8/24 7/22 7/26 7/29 8/2 8/6 8/9 8/12    Step ups  8" 3x10    8" 3x10 8" 3x10   8" 3x10 8" 3x10   Lateral step downs  8" 3x10    8" 3x10  8" 3x10   8" 3x10 8" 3x10   Elliptical  RB 5'       3'  RB 5' RB 5'  RB 5'    Leg press u/l in plantar flexion 30/ 3x10       30/ 3x15 30/ 3x15 30/     LP 2 leg concentric, 1 leg eccentric 70/ 3x10         70/ 3x10 70/ 3x10    Modalities   7/22 7/26 7/29 8/2 8/6 8/9 8/12 8/20                CT

## 2021-08-26 ENCOUNTER — OFFICE VISIT (OUTPATIENT)
Dept: PHYSICAL THERAPY | Facility: CLINIC | Age: 61
End: 2021-08-26
Payer: COMMERCIAL

## 2021-08-26 DIAGNOSIS — M79.605 LEFT LEG PAIN: ICD-10-CM

## 2021-08-26 DIAGNOSIS — M54.16 LUMBAR RADICULOPATHY: ICD-10-CM

## 2021-08-26 DIAGNOSIS — M17.12 PRIMARY OSTEOARTHRITIS OF LEFT KNEE: Primary | ICD-10-CM

## 2021-08-26 PROCEDURE — 97140 MANUAL THERAPY 1/> REGIONS: CPT | Performed by: PHYSICAL THERAPIST

## 2021-08-26 PROCEDURE — 97110 THERAPEUTIC EXERCISES: CPT | Performed by: PHYSICAL THERAPIST

## 2021-08-26 NOTE — PROGRESS NOTES
Daily Note     Today's date: 2021  Patient name: Markus Saavedra  : 1960  MRN: [id number]  Referring provider: Igor Wang MD  Dx:   Encounter Diagnosis     ICD-10-CM    1  Primary osteoarthritis of left knee  M17 12    2  Left leg pain  M79 605    3  Lumbar radiculopathy  M54 16             Subjective: Pt reported that she has been "feeling pretty good the past 3 days "     Objective: See treatment diary below    Assessment: Tolerated treatment well  Focused MRE's on quad in seated w/ concentric and eccentric, as well as hip abduction, which she performed well without provocation of pain  Patient demonstrated fatigue post treatment, exhibited good technique with therapeutic exercises and would benefit from continued PT  Plan: Continue per plan of care        Precautions:   HTN  Family history of cardiovascular disease   Depression, treated w/ medication and psych    Manuals    STM         JZ    Jt mobs             Mobilization with movement/over pressure         JZ JW   MRE knee ext/flex  JZ           MRE hip abd  JZ           Neuro Re-Ed    TA march              TA SLR  4#  3x10        3x10 b/l     TA BKFO              Pallof press             Pallof rotation              pallof punch             Plank             BOSU crunch                                       Ther Ex    REIL             TONY             S/l thoracic rotation        3# 20x b/l   3# 20x b/l    LAQ, slow eccentric  20# 3x10            Calf stretch step         :30x3 :30x3    Prone knee flexion stretch :30x3  :30x3     :30x3 :30x3 :30x3 ea :30x3 ea    Seated hamstring stretch step :30x3 ea  :30x3     :30x3 :30x3 :30x3 ea :30x3 ea    S/l hip abduction       2x10 ea 2x10      Figure 4 bridge       2x10 ea 3x10 ea      Cat/cow             Clamshells              Bridges              Lunges  Fwd/lat 3x10        Slider 2x10 ea Slider 3x10 ea fw lat 2x10 ea   Ther Activity 8/24 8/26 8/6 8/9 8/12    Step ups  8" 3x10        8" 3x10 8" 3x10   Lateral step downs  8" 3x10        8" 3x10 8" 3x10   Elliptical  RB 5'       3'  RB 5' RB 5'  RB 5'    Leg press u/l in plantar flexion 30/ 3x10       30/ 3x15 30/ 3x15 30/     LP 2 leg concentric, 1 leg eccentric 70/ 3x10         70/ 3x10 70/ 3x10    Modalities  8/26 8/6 8/9 8/12 8/20                CT

## 2021-08-30 ENCOUNTER — OFFICE VISIT (OUTPATIENT)
Dept: PHYSICAL THERAPY | Facility: CLINIC | Age: 61
End: 2021-08-30
Payer: COMMERCIAL

## 2021-08-30 DIAGNOSIS — M54.16 LUMBAR RADICULOPATHY: ICD-10-CM

## 2021-08-30 DIAGNOSIS — M79.605 LEFT LEG PAIN: ICD-10-CM

## 2021-08-30 DIAGNOSIS — M17.12 PRIMARY OSTEOARTHRITIS OF LEFT KNEE: Primary | ICD-10-CM

## 2021-08-30 PROCEDURE — 97110 THERAPEUTIC EXERCISES: CPT

## 2021-08-30 PROCEDURE — 97140 MANUAL THERAPY 1/> REGIONS: CPT

## 2021-08-30 NOTE — PROGRESS NOTES
Daily Note     Today's date: 2021  Patient name: Jeff Dhaliwal  : 1960  MRN: [id number]  Referring provider: Andrews Rodarte MD  Dx:   Encounter Diagnosis     ICD-10-CM    1  Primary osteoarthritis of left knee  M17 12    2  Left leg pain  M79 605    3  Lumbar radiculopathy  M54 16             Subjective: Vasquez Robertson reports her L knee has felt really good since LV  She state she went on a 6 mile walk yesterday, minimal aggravation occurring  She request updated HEP for 10 day break  Objective: See treatment diary below    Assessment: Vasquez Robertson tolerated PT treatment well  Continued with MRE's, pt performed with good tolerance and w/o symptom provocation  Greatest weakness observed with hip abduction and eccentric quad  Side lying hip abduction, TB side steps, and TB bridge added to exercise program  Fatigue evident post session  Pt was give updated HEP  Pt would benefit from continued PT to further address impairments and maximize functional level  Plan: Continue per plan of care        Precautions:   HTN  Family history of cardiovascular disease   Depression, treated w/ medication and psych    Manuals    STM         JZ    Jt mobs             Mobilization with movement/over pressure         JZ JW   MRE knee ext/flex  JZ JW          MRE hip abd  JZ JW          Neuro Re-Ed    TA march              TA SLR  4#  3x10        3x10 b/l     TA BKFO              Pallof press             Pallof rotation              pallof punch             Plank             BOSU crunch                                       Ther Ex    REIL             TONY             S/l thoracic rotation        3# 20x b/l   3# 20x b/l    LAQ, slow eccentric  20# 3x10  20# 3x10          Calf stretch step         :30x3 :30x3    Prone knee flexion stretch :30x3  :30x3 :30x3    :30x3 :30x3 :30x3 ea :30x3 ea    Seated hamstring stretch step :30x3 ea  :30x3     :30x3 :30x3 :30x3 ea :30x3 ea    S/l hip abduction   3x10    2x10 ea 2x10      Figure 4 bridge       2x10 ea 3x10 ea      Cat/cow             Clamshells              Bridges    BTB 3x10          Lunges  Fwd/lat 3x10  Static 2x10 b/l      Slider 2x10 ea Slider 3x10 ea fw lat 2x10 ea   Ther Activity 8/24 8/26 8/30 8/6 8/9 8/12    Step ups  8" 3x10        8" 3x10 8" 3x10   Lateral step downs  8" 3x10  8" 3x10      8" 3x10 8" 3x10   Elliptical  RB 5'   RB 5'    3'  RB 5' RB 5'  RB 5'    Leg press u/l in plantar flexion 30/ 3x10       30/ 3x15 30/ 3x15 30/     LP 2 leg concentric, 1 leg eccentric 70/ 3x10         70/ 3x10 70/ 3x10    TB side step    BTB 5 laps           Modalities  8/26 8/30 8/6 8/9 8/12 8/20                CT

## 2021-09-01 ENCOUNTER — APPOINTMENT (OUTPATIENT)
Dept: PHYSICAL THERAPY | Facility: CLINIC | Age: 61
End: 2021-09-01
Payer: COMMERCIAL

## 2021-09-10 ENCOUNTER — OFFICE VISIT (OUTPATIENT)
Dept: PHYSICAL THERAPY | Facility: CLINIC | Age: 61
End: 2021-09-10
Payer: COMMERCIAL

## 2021-09-10 DIAGNOSIS — M54.16 LUMBAR RADICULOPATHY: ICD-10-CM

## 2021-09-10 DIAGNOSIS — M17.12 PRIMARY OSTEOARTHRITIS OF LEFT KNEE: Primary | ICD-10-CM

## 2021-09-10 DIAGNOSIS — M79.605 LEFT LEG PAIN: ICD-10-CM

## 2021-09-10 PROCEDURE — 97140 MANUAL THERAPY 1/> REGIONS: CPT | Performed by: PHYSICAL THERAPIST

## 2021-09-10 PROCEDURE — 97110 THERAPEUTIC EXERCISES: CPT | Performed by: PHYSICAL THERAPIST

## 2021-09-10 NOTE — PROGRESS NOTES
Daily Note     Today's date: 9/10/2021  Patient name: Moody Hagen  : 1960  MRN: [id number]  Referring provider: Philip Solomon MD  Dx:   Encounter Diagnosis     ICD-10-CM    1  Primary osteoarthritis of left knee  M17 12    2  Left leg pain  M79 605    3  Lumbar radiculopathy  M54 16             Subjective: Pt reported that she has had to take some Ibuprofen occasionally at night recently due to "a bunch of aches and pains "     Objective: See treatment diary below    Assessment: Tolerated treatment well  Patient demonstrated fatigue post treatment, exhibited good technique with therapeutic exercises and would benefit from continued PT  Plan: Continue per plan of care        Precautions:   HTN  Family history of cardiovascular disease   Depression, treated w/ medication and psych    Manuals 8/24 8/26 8/30 9/10      8/20   STM             Jt mobs             Mobilization with movement/over pressure          JW   MRE knee ext/flex  JZ JW JZ         MRE hip abd  JZ  JZ         Neuro Re-Ed 8/24 8/26 8/30 9/10      8/20   TA march              TA SLR  4#  3x10             TA BKFO              Pallof press             Pallof rotation              pallof punch             Plank             BOSU crunch                                       Ther Ex 8/24 8/26 8/30 9/10      8/20   REIL             TONY             S/l thoracic rotation          3# 20x b/l    LAQ, slow eccentric  20# 3x10  20# 3x10          Calf stretch step          :30x3    Prone knee flexion stretch :30x3  :30x3 :30x3 :30x3 ea      :30x3 ea    Seated hamstring stretch step :30x3 ea  :30x3        :30x3 ea    S/l hip abduction   3x10 3x10 ea         SLR in long sitting    3x15 ea         Cat/cow             Clamshells              Bridges    BTB 3x10          Lunges  Fwd/lat 3x10  Static 2x10 b/l       Slider 3x10 ea fw lat 2x10 ea   Ther Activity  9/10         Step ups  8" 3x10         8" 3x10   Lateral step downs  8" 3x10 8" 3x10       8" 3x10   Elliptical  RB 5'   RB 5' 3'       RB 5'    Leg press u/l in plantar flexion 30/ 3x10             LP 2 leg concentric, 1 leg eccentric 70/ 3x10          70/ 3x10    TB side step    BTB 5 laps           Modalities  8/26 8/30 9/10      8/20                CT

## 2021-09-13 ENCOUNTER — OFFICE VISIT (OUTPATIENT)
Dept: PHYSICAL THERAPY | Facility: CLINIC | Age: 61
End: 2021-09-13
Payer: COMMERCIAL

## 2021-09-13 DIAGNOSIS — M54.16 LUMBAR RADICULOPATHY: ICD-10-CM

## 2021-09-13 DIAGNOSIS — M17.12 PRIMARY OSTEOARTHRITIS OF LEFT KNEE: Primary | ICD-10-CM

## 2021-09-13 DIAGNOSIS — M79.605 LEFT LEG PAIN: ICD-10-CM

## 2021-09-13 PROCEDURE — 97110 THERAPEUTIC EXERCISES: CPT

## 2021-09-13 PROCEDURE — 97530 THERAPEUTIC ACTIVITIES: CPT

## 2021-09-13 NOTE — PROGRESS NOTES
Daily Note     Today's date: 2021  Patient name: Jad Good  : 1960  MRN: [id number]  Referring provider: Marcy Osborn MD  Dx:   Encounter Diagnosis     ICD-10-CM    1  Primary osteoarthritis of left knee  M17 12    2  Left leg pain  M79 605    3  Lumbar radiculopathy  M54 16             Subjective: Raymundo Jarrell reports continued soreness in L hip with day to day activities  She remains very active throughout the day and notes ache and soreness by the end of the day  She is taking NSAID to assist with discomfort  Objective: See treatment diary below    Assessment: Raymundo Jarrell tolerated PT treatment well  Initiated session with elliptical warmup  Continued with focus on hip strengthening  Pt very challenged with current exercise program  Significant fatigue evident by third set with SLR and hip abduction  TB x walks added to program, good tolerance  Pt would benefit from continued PT to further address impairments and maximize functional level  Plan: Continue per plan of care        Precautions:   HTN  Family history of cardiovascular disease   Depression, treated w/ medication and psych    Manuals 8/24 8/26 8/30 9/10 9/13     8/20   STM             Jt mobs             Mobilization with movement/over pressure          JW   MRE knee ext/flex  JZ JW JZ         MRE hip abd  JZ JW JZ         Neuro Re-Ed 8/24 8/26 8/30 9/10 9/13     8/20   TA march              TA SLR  4#  3x10             TA BKFO              Pallof press             Pallof rotation              pallof punch             Plank             BOSU crunch                                       Ther Ex 8/24 8/26 8/30 9/10 9/13     8/20   REIL             TONY             S/l thoracic rotation          3# 20x b/l    LAQ, slow eccentric  20# 3x10  20# 3x10          Calf stretch step          :30x3    Prone knee flexion stretch :30x3  :30x3 :30x3 :30x3 ea :30x3 ea      :30x3 ea    Seated hamstring stretch step :30x3 ea  :30x3        :30x3 ea S/l hip abduction   3x10 3x10 ea 3x15 ea         SLR in long sitting    3x15 ea 3x15 ea         Cat/cow             Clamshells              Bridges    BTB 3x10  BTB 3x10        Lunges  Fwd/lat 3x10  Static 2x10 b/l       Slider 3x10 ea fw lat 2x10 ea   Ther Activity 8/24 8/26 8/30 9/10 9/13        Step ups  8" 3x10         8" 3x10   Lateral step downs  8" 3x10  8" 3x10       8" 3x10   Elliptical  RB 5'   RB 5' 3'  5'     RB 5'    Leg press u/l in plantar flexion 30/ 3x10             LP 2 leg concentric, 1 leg eccentric 70/ 3x10          70/ 3x10    TB side step    BTB 5 laps   X walk BTB    2 laps         Modalities  8/26 8/30 9/10 9/13     8/20                CT

## 2021-09-24 ENCOUNTER — APPOINTMENT (OUTPATIENT)
Dept: PHYSICAL THERAPY | Facility: CLINIC | Age: 61
End: 2021-09-24
Payer: COMMERCIAL

## 2021-09-27 ENCOUNTER — OFFICE VISIT (OUTPATIENT)
Dept: PHYSICAL THERAPY | Facility: CLINIC | Age: 61
End: 2021-09-27
Payer: COMMERCIAL

## 2021-09-27 DIAGNOSIS — M79.605 LEFT LEG PAIN: ICD-10-CM

## 2021-09-27 DIAGNOSIS — M54.16 LUMBAR RADICULOPATHY: ICD-10-CM

## 2021-09-27 DIAGNOSIS — M17.12 PRIMARY OSTEOARTHRITIS OF LEFT KNEE: Primary | ICD-10-CM

## 2021-09-27 PROCEDURE — 97140 MANUAL THERAPY 1/> REGIONS: CPT

## 2021-09-27 PROCEDURE — 97530 THERAPEUTIC ACTIVITIES: CPT

## 2021-09-27 PROCEDURE — 97110 THERAPEUTIC EXERCISES: CPT

## 2021-09-27 NOTE — PROGRESS NOTES
Daily Note     Today's date: 2021  Patient name: Anjel Clayton  : 1960  MRN: [id number]  Referring provider: Velvet Barros MD  Dx:   Encounter Diagnosis     ICD-10-CM    1  Primary osteoarthritis of left knee  M17 12    2  Left leg pain  M79 605    3  Lumbar radiculopathy  M54 16             Subjective: Naveen Jack reports she was away in the city this past week, did minimal activity and felt really good  She states she went on a walk recently and her L knee and hip started to become aggravated, sx resolved this am       Objective: See treatment diary below    Assessment: Naveen Jack tolerated PT treatment well  Manual stretch performed to L hip, limited flexibility present with IR/ER and hamstring  Continued with glut strengthening, greater challenge present on LLE  Fire hydrants added to exercise program, moderate challenge with difficulty correcting contralateral hip drop  Encouraged increase in stretching prior to walking  Pt would benefit from continued PT to further address impairments and maximize functional level  Plan: Continue per plan of care        Precautions:   HTN  Family history of cardiovascular disease   Depression, treated w/ medication and psych    Manuals 8/24 8/26 8/30 9/10 9/13 9/27    8/20   STM             Jt mobs             Mobilization with movement/over pressure          JW   MRE knee ext/flex  JZ JW JZ  PROM L hip  JW          MRE hip abd  JZ JW JZ         Neuro Re-Ed 8/24 8/26 8/30 9/10 9/13 9/27    8/20   TA march              TA SLR  4#  3x10             TA BKFO              Pallof press             Pallof rotation              pallof punch             Plank             BOSU crunch             Fire hydrants       3x10 b/l                    Ther Ex 8/24 8/26 8/30 9/10 9/13 9/27    8/20   REIL             TONY             S/l thoracic rotation          3# 20x b/l    LAQ, slow eccentric  20# 3x10  20# 3x10          Calf stretch step          :30x3    Prone knee flexion stretch :30x3  :30x3 :30x3 :30x3 ea :30x3 ea  Manual     :30x3 ea    Seated hamstring stretch step :30x3 ea  :30x3        :30x3 ea    S/l hip abduction   3x10 3x10 ea 3x15 ea  3x15 ea       SLR in long sitting    3x15 ea 3x15 ea  3x15 ea        Cat/cow             Clamshells              Bridges    BTB 3x10  BTB 3x10 BTB 3x10        Lunges  Fwd/lat 3x10  Static 2x10 b/l       Slider 3x10 ea fw lat 2x10 ea   Ther Activity 8/24 8/26 8/30 9/10 9/13 9/27       Step ups  8" 3x10         8" 3x10   Lateral step downs  8" 3x10  8" 3x10       8" 3x10   Elliptical  RB 5'   RB 5' 3'  5' RB 5'     RB 5'    Leg press u/l in plantar flexion 30/ 3x10             LP 2 leg concentric, 1 leg eccentric 70/ 3x10          70/ 3x10    TB side step    BTB 5 laps   X walk BTB    2 laps  BTB 3 laps        Modalities  8/26 8/30 9/10 9/13 9/27    8/20                CT

## 2021-09-30 ENCOUNTER — APPOINTMENT (OUTPATIENT)
Dept: PHYSICAL THERAPY | Facility: CLINIC | Age: 61
End: 2021-09-30
Payer: COMMERCIAL

## 2021-10-04 ENCOUNTER — OFFICE VISIT (OUTPATIENT)
Dept: PHYSICAL THERAPY | Facility: CLINIC | Age: 61
End: 2021-10-04
Payer: COMMERCIAL

## 2021-10-04 DIAGNOSIS — M17.12 PRIMARY OSTEOARTHRITIS OF LEFT KNEE: Primary | ICD-10-CM

## 2021-10-04 DIAGNOSIS — M79.605 LEFT LEG PAIN: ICD-10-CM

## 2021-10-04 DIAGNOSIS — M54.16 LUMBAR RADICULOPATHY: ICD-10-CM

## 2021-10-04 PROCEDURE — 97140 MANUAL THERAPY 1/> REGIONS: CPT | Performed by: PHYSICAL THERAPIST

## 2021-10-04 PROCEDURE — 97110 THERAPEUTIC EXERCISES: CPT | Performed by: PHYSICAL THERAPIST

## 2021-10-04 PROCEDURE — 97530 THERAPEUTIC ACTIVITIES: CPT | Performed by: PHYSICAL THERAPIST

## 2021-10-11 ENCOUNTER — APPOINTMENT (OUTPATIENT)
Dept: PHYSICAL THERAPY | Facility: CLINIC | Age: 61
End: 2021-10-11
Payer: COMMERCIAL

## 2021-10-15 ENCOUNTER — OFFICE VISIT (OUTPATIENT)
Dept: PHYSICAL THERAPY | Facility: CLINIC | Age: 61
End: 2021-10-15
Payer: COMMERCIAL

## 2021-10-15 DIAGNOSIS — M54.16 LUMBAR RADICULOPATHY: ICD-10-CM

## 2021-10-15 DIAGNOSIS — M17.12 PRIMARY OSTEOARTHRITIS OF LEFT KNEE: Primary | ICD-10-CM

## 2021-10-15 DIAGNOSIS — M79.605 LEFT LEG PAIN: ICD-10-CM

## 2021-10-15 PROCEDURE — 97140 MANUAL THERAPY 1/> REGIONS: CPT

## 2021-10-15 PROCEDURE — 97110 THERAPEUTIC EXERCISES: CPT

## 2021-10-15 PROCEDURE — 97530 THERAPEUTIC ACTIVITIES: CPT

## 2021-10-18 ENCOUNTER — OFFICE VISIT (OUTPATIENT)
Dept: PHYSICAL THERAPY | Facility: CLINIC | Age: 61
End: 2021-10-18
Payer: COMMERCIAL

## 2021-10-18 DIAGNOSIS — M17.12 PRIMARY OSTEOARTHRITIS OF LEFT KNEE: Primary | ICD-10-CM

## 2021-10-18 DIAGNOSIS — M54.16 LUMBAR RADICULOPATHY: ICD-10-CM

## 2021-10-18 DIAGNOSIS — M79.605 LEFT LEG PAIN: ICD-10-CM

## 2021-10-18 PROCEDURE — 97112 NEUROMUSCULAR REEDUCATION: CPT

## 2021-10-18 PROCEDURE — 97110 THERAPEUTIC EXERCISES: CPT

## 2021-10-25 ENCOUNTER — APPOINTMENT (OUTPATIENT)
Dept: PHYSICAL THERAPY | Facility: CLINIC | Age: 61
End: 2021-10-25
Payer: COMMERCIAL